# Patient Record
Sex: MALE | Race: BLACK OR AFRICAN AMERICAN | NOT HISPANIC OR LATINO | URBAN - METROPOLITAN AREA
[De-identification: names, ages, dates, MRNs, and addresses within clinical notes are randomized per-mention and may not be internally consistent; named-entity substitution may affect disease eponyms.]

---

## 2023-03-14 ENCOUNTER — INPATIENT (INPATIENT)
Facility: HOSPITAL | Age: 53
LOS: 0 days | Discharge: HOME | DRG: 247 | End: 2023-03-15
Attending: INTERNAL MEDICINE | Admitting: INTERNAL MEDICINE
Payer: COMMERCIAL

## 2023-03-14 ENCOUNTER — TRANSCRIPTION ENCOUNTER (OUTPATIENT)
Age: 53
End: 2023-03-14

## 2023-03-14 VITALS
DIASTOLIC BLOOD PRESSURE: 102 MMHG | HEART RATE: 96 BPM | OXYGEN SATURATION: 96 % | SYSTOLIC BLOOD PRESSURE: 176 MMHG | RESPIRATION RATE: 18 BRPM | WEIGHT: 250 LBS | TEMPERATURE: 98 F | HEIGHT: 72 IN

## 2023-03-14 DIAGNOSIS — R07.9 CHEST PAIN, UNSPECIFIED: ICD-10-CM

## 2023-03-14 DIAGNOSIS — Z98.890 OTHER SPECIFIED POSTPROCEDURAL STATES: Chronic | ICD-10-CM

## 2023-03-14 LAB — SARS-COV-2 RNA SPEC QL NAA+PROBE: SIGNIFICANT CHANGE UP

## 2023-03-14 PROCEDURE — 92928 PRQ TCAT PLMT NTRAC ST 1 LES: CPT | Mod: LC

## 2023-03-14 PROCEDURE — 93010 ELECTROCARDIOGRAM REPORT: CPT

## 2023-03-14 PROCEDURE — 99152 MOD SED SAME PHYS/QHP 5/>YRS: CPT

## 2023-03-14 PROCEDURE — 93010 ELECTROCARDIOGRAM REPORT: CPT | Mod: 77

## 2023-03-14 RX ORDER — LOSARTAN POTASSIUM 100 MG/1
50 TABLET, FILM COATED ORAL DAILY
Refills: 0 | Status: DISCONTINUED | OUTPATIENT
Start: 2023-03-14 | End: 2023-03-15

## 2023-03-14 RX ORDER — ASPIRIN/CALCIUM CARB/MAGNESIUM 324 MG
81 TABLET ORAL DAILY
Refills: 0 | Status: DISCONTINUED | OUTPATIENT
Start: 2023-03-14 | End: 2023-03-15

## 2023-03-14 RX ORDER — CLOPIDOGREL BISULFATE 75 MG/1
1 TABLET, FILM COATED ORAL
Qty: 0 | Refills: 0 | DISCHARGE

## 2023-03-14 RX ORDER — SODIUM CHLORIDE 9 MG/ML
1000 INJECTION INTRAMUSCULAR; INTRAVENOUS; SUBCUTANEOUS
Refills: 0 | Status: DISCONTINUED | OUTPATIENT
Start: 2023-03-14 | End: 2023-03-14

## 2023-03-14 RX ORDER — CLOPIDOGREL BISULFATE 75 MG/1
75 TABLET, FILM COATED ORAL DAILY
Refills: 0 | Status: DISCONTINUED | OUTPATIENT
Start: 2023-03-14 | End: 2023-03-15

## 2023-03-14 RX ORDER — ATORVASTATIN CALCIUM 80 MG/1
1 TABLET, FILM COATED ORAL
Qty: 0 | Refills: 0 | DISCHARGE

## 2023-03-14 RX ORDER — SODIUM CHLORIDE 9 MG/ML
1000 INJECTION INTRAMUSCULAR; INTRAVENOUS; SUBCUTANEOUS
Refills: 0 | Status: DISCONTINUED | OUTPATIENT
Start: 2023-03-14 | End: 2023-03-15

## 2023-03-14 RX ORDER — ATORVASTATIN CALCIUM 80 MG/1
1 TABLET, FILM COATED ORAL
Qty: 30 | Refills: 0
Start: 2023-03-14 | End: 2023-04-12

## 2023-03-14 RX ORDER — CLOPIDOGREL BISULFATE 75 MG/1
1 TABLET, FILM COATED ORAL
Qty: 90 | Refills: 3
Start: 2023-03-14 | End: 2024-03-07

## 2023-03-14 RX ORDER — ISOSORBIDE MONONITRATE 60 MG/1
30 TABLET, EXTENDED RELEASE ORAL DAILY
Refills: 0 | Status: DISCONTINUED | OUTPATIENT
Start: 2023-03-14 | End: 2023-03-15

## 2023-03-14 RX ORDER — ISOSORBIDE MONONITRATE 60 MG/1
1 TABLET, EXTENDED RELEASE ORAL
Qty: 0 | Refills: 0 | DISCHARGE

## 2023-03-14 RX ORDER — ATORVASTATIN CALCIUM 80 MG/1
40 TABLET, FILM COATED ORAL AT BEDTIME
Refills: 0 | Status: DISCONTINUED | OUTPATIENT
Start: 2023-03-14 | End: 2023-03-15

## 2023-03-14 RX ORDER — LOSARTAN POTASSIUM 100 MG/1
1 TABLET, FILM COATED ORAL
Qty: 0 | Refills: 0 | DISCHARGE

## 2023-03-14 RX ORDER — ASPIRIN/CALCIUM CARB/MAGNESIUM 324 MG
1 TABLET ORAL
Qty: 0 | Refills: 0 | DISCHARGE

## 2023-03-14 RX ADMIN — SODIUM CHLORIDE 75 MILLILITER(S): 9 INJECTION INTRAMUSCULAR; INTRAVENOUS; SUBCUTANEOUS at 12:26

## 2023-03-14 RX ADMIN — SODIUM CHLORIDE 250 MILLILITER(S): 9 INJECTION INTRAMUSCULAR; INTRAVENOUS; SUBCUTANEOUS at 10:50

## 2023-03-14 RX ADMIN — SODIUM CHLORIDE 150 MILLILITER(S): 9 INJECTION INTRAMUSCULAR; INTRAVENOUS; SUBCUTANEOUS at 18:12

## 2023-03-14 RX ADMIN — ATORVASTATIN CALCIUM 40 MILLIGRAM(S): 80 TABLET, FILM COATED ORAL at 21:43

## 2023-03-14 NOTE — DISCHARGE NOTE PROVIDER - NSDCCPCAREPLAN_GEN_ALL_CORE_FT
PRINCIPAL DISCHARGE DIAGNOSIS  Diagnosis: CAD (coronary artery disease)  Assessment and Plan of Treatment: Low salt, low fat diet.   Weight management.   Take medications as prescribed.    No smoking.  Follow up appointments with your doctor(s)  as instructed.        SECONDARY DISCHARGE DIAGNOSES  Diagnosis: HLD (hyperlipidemia)  Assessment and Plan of Treatment: Your LDL cholesterol will be less than 70mg/dL  Continue with your cholesterol medications. Eat a heart healthy diet that is low in saturated fats and salt, and includes whole grains, fruits, vegetables and lean protein; exercise regularly (consult with your physician or cardiologist first); maintain a heart healthy weight; if you smoke - quit (A resource to help you stop smoking is the [facility] – phone number [phone].). Continue to follow with your primary physician or cardiologist.

## 2023-03-14 NOTE — PATIENT PROFILE ADULT - FUNCTIONAL ASSESSMENT - BASIC MOBILITY 6.
Called pt and informed him Dr. Issa has ordered MRA COW with and with out contrast and gave him # to Imaging. Informed him Dr. Issa would like to see him in follow up. Gave clinic # to schedule and he verbally understood.    4 = No assist / stand by assistance

## 2023-03-14 NOTE — DISCHARGE NOTE PROVIDER - NSDCCPTREATMENT_GEN_ALL_CORE_FT
PRINCIPAL PROCEDURE  Procedure: Left heart cardiac catheterization  Findings and Treatment: 3/14 s/p MAMIE x1 to OM1 via RRA       PRINCIPAL PROCEDURE  Procedure: Left heart cardiac catheterization  Findings and Treatment: 3/14 s/p MAMIE x1 to OM1 via RRA  No heavy lifting for 2 weeks, no strenuous activity  ( pushing/ pulling) no driving for x 2 days,  you may shower 24 hours following procedure but no bathing or swimming for x1  week, no strenuous activity for x 1 week & follow up with your cardiologist in 1-2 week  Do not stop your Aspirin or Plavix unless instructed to do so by your cardiologist.

## 2023-03-14 NOTE — DISCHARGE NOTE PROVIDER - CARE PROVIDER_API CALL
Noble Palacios)  Cardiology; Internal Medicine  [address] [place] [address]  Phone: [phone]  Fax: [phone]  Follow Up Time: 2 weeks

## 2023-03-14 NOTE — PATIENT PROFILE ADULT - NSPROSPHOSPCHAPLAINYN_GEN_A_NUR
Number Of Freeze-Thaw Cycles: 3 freeze-thaw cycles
Post-Care Instructions: I reviewed with the patient in detail post-care instructions. Patient is to wear sunprotection, and avoid picking at any of the treated lesions. Pt may apply Vaseline to crusted or scabbing areas.
Duration Of Freeze Thaw-Cycle (Seconds): 0
Detail Level: Simple
Consent: The patient's consent was obtained including but not limited to risks of crusting, scabbing, blistering, scarring, darker or lighter pigmentary change, recurrence, incomplete removal and infection.
Render Post-Care Instructions In Note?: no
no

## 2023-03-14 NOTE — DISCHARGE NOTE PROVIDER - NSDCFUADDINST_GEN_ALL_CORE_FT
Wound Care: The day AFTER your procedure:  Remove bandage GENTLY, and clean using mild soap and gentle warm, water stream, pat dry.   Leave OPEN to air. YOU MAY SHOWER  DO NOT SOAK your procedure site for 1 week (no baths, no pools, no tubs)  Check your wrist or groin puncture site everyday.  A small amount of soreness and bruising is normal  ACTIVITY for the next 24 hours:  1) DO NOT DRIVE  2) DO NOT make any important decisions or sign legal documents  3) DO NOT operate heavy [employer]   You may resume sexual activity in 48 hours, unless otherwise instructed by your cardiologist  If your procedure was done through the WRIST, for the NEXT 3 DAYS:  AVOID pushing pulling  or repeated movement of that hand and wrist (eg: typing, hammering)  DO NOT LIFT anything more than 5 lbs     If your procedure was done through the GROIN, for the NEXT 5 DAYS:  Limit climbing the stairs, no strenuous activities, no pushing, no pulling, no straining  Do not lift anything that is 10lbs or heavier   MEDICATION:  Take your medications as explained (see discharge paperwork). If you recieved a stent, you will be taking medication to KEEP YOUR STENT OPEN. DO NOT STOP THESE MEDICATIONS UNLESS DIRECTED BY A CARDIOLOGIST  If you smoke, WE RECOMMEND YOU QUIT (you may call [phone] the Center of Tobacco Control if you need assistance)   FOLLOW UP: Please follow up with your private cardiologist (insert name) in 2 weeks.  Please call immediately for an appointment upon discharge from the hospital.    ***CALL YOUR DOCTOR***   If you experience: chest pain, fever, chills, body aches, or severe pain, swelling, redness, heat or yellow discharge at incision site or if you experience bleeding, temperature change, numbness or excruciating pain at the procedural site  If you are unable to reach your doctor, you may contact:    Cardiology Office at [facility] at [phone]   Cardiac Short Stay Unit (CSSU) [phone]    Cardiac Recovery Suite (CRS) [phone]

## 2023-03-14 NOTE — H&P CARDIOLOGY - HISTORY OF PRESENT ILLNESS
52 yo M Creole speaking ( [id number]) man with PMHx of HTN (Lisinopril/HCTZ did not take due to cough?) presented to [facility] ER on 3/10 with left sided chest pain. ASA, Plavix loaded, Lovenox 120mg BID (last dose was 3/13/10pm). <--148, Troponin negative (6.5) KEYLA: EF 55-60%, Cath on 3/13 showed OM1 90%, LM 20%, pLAD 20%, diag 30-40%, RCA 30%, now transferred to [facility] for PCI. Pt currently denies chest pain, SOB, dizziness or palpitation.     CBC: wnl, BUN/Cr 8/1.1 GFR>60              Cath on 3/13 showed OM1 90%, LM 20%, pLAD 20%, diag 30-40%, RCA 30%  TTE: EF 55-60%

## 2023-03-14 NOTE — PATIENT PROFILE ADULT - FALL HARM RISK - FACTORS NURSING JUDGEMENT
HPI  Pt here for upper right arm pain. 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 Taking aleve without improvement. Usually lays on right side but due to pain can't. Was carrying bags yesterday and felt numbness and tingling in right hand. on file      Highest education level: Not on file    Occupational History      Not on file    Social Needs      Financial resource strain: Not on file      Food insecurity        Worry: Not on file        Inability: Not on file      Transportation needs Dispense Refill   • naproxen 500 MG Oral Tab Take 1 tablet (500 mg total) by mouth 2 (two) times daily with meals.  60 tablet 1   • ALPRAZolam 0.25 MG Oral Tab TAKE 1 TABLET BY MOUTH EVERY NIGHT AS NEEDED FOR SLEEP OR ANXIETY 30 tablet 1   • aspirin 81 MG O No

## 2023-03-14 NOTE — DISCHARGE NOTE PROVIDER - NSDCMRMEDTOKEN_GEN_ALL_CORE_FT
Aspirin Enteric Coated 81 mg oral delayed release tablet: 1 tab(s) orally once a day  atorvastatin 40 mg oral tablet: 1 tab(s) orally once a day  Imdur 30 mg oral tablet, extended release: 1 tab(s) orally once a day (in the morning)  losartan 50 mg oral tablet: 1 tab(s) orally once a day  Lovenox 120 mg/0.8 mL injectable solution: 1 day(s) injectable 2 times a day  Last dose at 3/11 am  Plavix 75 mg oral tablet: 1 tab(s) orally once a day   Aspirin Enteric Coated 81 mg oral delayed release tablet: 1 tab(s) orally once a day  atorvastatin 40 mg oral tablet: 1 tab(s) orally once a day  Imdur 30 mg oral tablet, extended release: 1 tab(s) orally once a day (in the morning)  losartan 50 mg oral tablet: 1 tab(s) orally once a day  Plavix 75 mg oral tablet: 1 tab(s) orally once a day

## 2023-03-14 NOTE — CHART NOTE - NSCHARTNOTEFT_GEN_A_CORE
RRA band off at 11:30    3/13 RCA x2 MAMIE via RRA access  Staged x2 MAMIE to pLAD today via RRA access    RN called to check site: mild tight but good Radial pulse,   manual compression multiple times    Evaluated by Dr. Karol Miramontes now,   Pt can be discharged F/U with Dr. ISAAC Landaverde   Site care and reportable S/S discussed, pt and care giver fully understood and verbalized.

## 2023-03-14 NOTE — DISCHARGE NOTE PROVIDER - HOSPITAL COURSE
HPI: 52 yo M Creole speaking ( [id number]) man with PMHx of HTN (Lisinopril/HCTZ did not take due to cough?) presented to [facility] ER on 3/10 with left sided chest pain. ASA, Plavix loaded, Lovenox 120mg BID (last dose was 3/13/10pm). <--148, Troponin negative (6.5) KEYLA: EF 55-60%, Cath on 3/13 showed OM1 90%, LM 20%, pLAD 20%, diag 30-40%, RCA 30%, now transferred to [facility] for PCI. Pt currently denies chest pain, SOB, dizziness or palpitation.     CBC: wnl, BUN/Cr 8/1.1 GFR>60              Cath on 3/13 showed OM1 90%, LM 20%, pLAD 20%, diag 30-40%, RCA 30%  TTE: EF 55-60% (14 Mar 2023 10:12)    3/14 s/p MAMIE x1 to OM via RRA, site without hematoma/bleeding. Patient without complaint, hemodynamically stable overnight. Pending discharge in AM

## 2023-03-15 VITALS
HEART RATE: 66 BPM | OXYGEN SATURATION: 95 % | DIASTOLIC BLOOD PRESSURE: 81 MMHG | SYSTOLIC BLOOD PRESSURE: 131 MMHG | TEMPERATURE: 98 F | RESPIRATION RATE: 17 BRPM

## 2023-03-15 LAB
ANION GAP SERPL CALC-SCNC: 10 MMOL/L — SIGNIFICANT CHANGE UP (ref 5–17)
BUN SERPL-MCNC: 9 MG/DL — SIGNIFICANT CHANGE UP (ref 7–23)
CALCIUM SERPL-MCNC: 9.3 MG/DL — SIGNIFICANT CHANGE UP (ref 8.4–10.5)
CHLORIDE SERPL-SCNC: 104 MMOL/L — SIGNIFICANT CHANGE UP (ref 96–108)
CO2 SERPL-SCNC: 26 MMOL/L — SIGNIFICANT CHANGE UP (ref 22–31)
CREAT SERPL-MCNC: 1 MG/DL — SIGNIFICANT CHANGE UP (ref 0.5–1.3)
EGFR: 89 ML/MIN/1.73M2 — SIGNIFICANT CHANGE UP
GLUCOSE SERPL-MCNC: 107 MG/DL — HIGH (ref 70–99)
HCT VFR BLD CALC: 44.1 % — SIGNIFICANT CHANGE UP (ref 39–50)
HGB BLD-MCNC: 14.4 G/DL — SIGNIFICANT CHANGE UP (ref 13–17)
MAGNESIUM SERPL-MCNC: 2.1 MG/DL — SIGNIFICANT CHANGE UP (ref 1.6–2.6)
MCHC RBC-ENTMCNC: 30.3 PG — SIGNIFICANT CHANGE UP (ref 27–34)
MCHC RBC-ENTMCNC: 32.7 GM/DL — SIGNIFICANT CHANGE UP (ref 32–36)
MCV RBC AUTO: 92.6 FL — SIGNIFICANT CHANGE UP (ref 80–100)
NRBC # BLD: 0 /100 WBCS — SIGNIFICANT CHANGE UP (ref 0–0)
PLATELET # BLD AUTO: 207 K/UL — SIGNIFICANT CHANGE UP (ref 150–400)
POTASSIUM SERPL-MCNC: 3.7 MMOL/L — SIGNIFICANT CHANGE UP (ref 3.5–5.3)
POTASSIUM SERPL-SCNC: 3.7 MMOL/L — SIGNIFICANT CHANGE UP (ref 3.5–5.3)
RBC # BLD: 4.76 M/UL — SIGNIFICANT CHANGE UP (ref 4.2–5.8)
RBC # FLD: 13.3 % — SIGNIFICANT CHANGE UP (ref 10.3–14.5)
SODIUM SERPL-SCNC: 140 MMOL/L — SIGNIFICANT CHANGE UP (ref 135–145)
WBC # BLD: 6.61 K/UL — SIGNIFICANT CHANGE UP (ref 3.8–10.5)
WBC # FLD AUTO: 6.61 K/UL — SIGNIFICANT CHANGE UP (ref 3.8–10.5)

## 2023-03-15 PROCEDURE — C9600: CPT | Mod: LC

## 2023-03-15 PROCEDURE — C1887: CPT

## 2023-03-15 PROCEDURE — 85027 COMPLETE CBC AUTOMATED: CPT

## 2023-03-15 PROCEDURE — C1874: CPT

## 2023-03-15 PROCEDURE — 80048 BASIC METABOLIC PNL TOTAL CA: CPT

## 2023-03-15 PROCEDURE — 99232 SBSQ HOSP IP/OBS MODERATE 35: CPT

## 2023-03-15 PROCEDURE — 83735 ASSAY OF MAGNESIUM: CPT

## 2023-03-15 PROCEDURE — U0003: CPT

## 2023-03-15 PROCEDURE — C1894: CPT

## 2023-03-15 PROCEDURE — C1725: CPT

## 2023-03-15 PROCEDURE — 93005 ELECTROCARDIOGRAM TRACING: CPT

## 2023-03-15 PROCEDURE — C1769: CPT

## 2023-03-15 RX ORDER — ENOXAPARIN SODIUM 100 MG/ML
1 INJECTION SUBCUTANEOUS
Qty: 0 | Refills: 0 | DISCHARGE

## 2023-03-15 RX ADMIN — Medication 81 MILLIGRAM(S): at 05:34

## 2023-03-15 RX ADMIN — LOSARTAN POTASSIUM 50 MILLIGRAM(S): 100 TABLET, FILM COATED ORAL at 05:34

## 2023-03-15 RX ADMIN — ISOSORBIDE MONONITRATE 30 MILLIGRAM(S): 60 TABLET, EXTENDED RELEASE ORAL at 05:34

## 2023-03-15 RX ADMIN — CLOPIDOGREL BISULFATE 75 MILLIGRAM(S): 75 TABLET, FILM COATED ORAL at 05:34

## 2023-03-15 NOTE — DISCHARGE NOTE NURSING/CASE MANAGEMENT/SOCIAL WORK - PATIENT PORTAL LINK FT
You can access the FollowMyHealth Patient Portal offered by [facility] by registering at the following website: [email or web address]. By joining [employer]’s FollowMyHealth portal, you will also be able to view your health information using other applications (apps) compatible with our system.

## 2023-03-15 NOTE — PROGRESS NOTE ADULT - ASSESSMENT
HPI: 52 yo M Creole speaking ( [id number]) man with PMHx of HTN (Lisinopril/HCTZ did not take due to cough?) presented to [facility] ER on 3/10 with left sided chest pain. ASA, Plavix loaded, Lovenox 120mg BID (last dose was 3/13/10pm). <--148, Troponin negative (6.5) KEYLA: EF 55-60%, Cath on 3/13 showed OM1 90%, LM 20%, pLAD 20%, diag 30-40%, RCA 30%, now transferred to [facility] for PCI. Pt currently denies chest pain, SOB, dizziness or palpitation.     CBC: wnl, BUN/Cr 8/1.1 GFR>60              Cath on 3/13 showed OM1 90%, LM 20%, pLAD 20%, diag 30-40%, RCA 30%  TTE: EF 55-60% (14 Mar 2023 10:12)      # CAD  3/14 s/p MAMIE x1 to OM1 via RRA  Right wrist stable w/o bleeding or hematoma; site soft, non tender.  Right radial pulse palpable +2.  Denies chest pain, denies right wrist/arm/hand:  pain, numbness, or tingling   Cont DAPT- aspirin and plavix  Resume antihypertensives- losartan  Cont statin- atorvastatin   Continue monitoring telemetry  Keep Mg >2 K >4  f/u appt in 2 weeks post dc with oupt cardiologist  Anticipate discharge in AM if site and condition remain stable    - Reviewed and reinforced with patient:  wound care instructions, activities dos and donts, medication compliance specifically antiplatelet therapy given stent/s.    - Patient aware to take DAPT  as prescribed and DO NOT STOP taking without consulting cardiologist first or STENT/s WILL CLOSE  - Reviewed and reinforced with patient:  site complications ( eg: bleeding, excruciating pain at the procedural site, large sweliing-golf ball size-  extremity numbness, tingling, temperature change), or CHEST PAIN; pt aware that if any of those occur he/she must call cardiologist IMMEDIATELY or 911 or go to nearest emergency room   - Reviewed and reinforced a heart healthy diet, Smoking Cessation  - Patient verbalizes understanding of ALL OF THE ABOVE, and gives positive feedback       Jermain Solano [facility]  Invasive Cardiology  Ext [phone]

## 2023-03-15 NOTE — PROGRESS NOTE ADULT - SUBJECTIVE AND OBJECTIVE BOX
[facility] INVASIVE CARDIOLOGY- (Boone, Nicholas, Mauricio, Sacha, Gilmar, Beth, Anthony, Kumar, Darian)   CARDIAC CATH LAB, ACP TEAM   [phone]    CHIEF COMPLAINT: Patient is a 53y old  Male who presents with a chief complaint of     HPI:  54 yo M Creole speaking ( [id number]) man with PMHx of HTN (Lisinopril/HCTZ did not take due to cough?) presented to [facility] ER on 3/10 with left sided chest pain. ASA, Plavix loaded, Lovenox 120mg BID (last dose was 3/13/10pm). <--148, Troponin negative (6.5) KEYLA: EF 55-60%, Cath on 3/13 showed OM1 90%, LM 20%, pLAD 20%, diag 30-40%, RCA 30%, now transferred to [facility] for PCI. Pt currently denies chest pain, SOB, dizziness or palpitation.     CBC: wnl, BUN/Cr 8/1.1 GFR>60              Cath on 3/13 showed OM1 90%, LM 20%, pLAD 20%, diag 30-40%, RCA 30%  TTE: EF 55-60% (14 Mar 2023 10:12)        Subjective/Observations: patient seen and examined.  denies chest pain, dyspena, dizziness, palpitations, N&V, HA      Review of Systems all WNL except below indicated:    Constitutional: [ ] Fever [ ] Chills [ ] Fatigue [ ] Weight change   HEENT: [ ] Blurred vision [ ] Eye Pain [ ] Headache [ ] Runny nose [ ] Sore Throat   Respiratory: [ ] Cough [ ] Wheezing [ ] Shortness of breath  Cardiovascular: [ ] Chest Pain [ ] Palpitations [ ] FUNEZ [ ] PND [ ] Orthopnea  Gastrointestinal: [ ] Abdominal Pain [ ] Diarrhea [ ] Constipation [ ] Hemorrhoids [ ] Nausea [ ] Vomiting  Genitourinary: [ ] Nocturia [ ] Dysuria [ ] Incontinence  Extremities: [ ] Swelling [ ] Joint Pain  Neurologic: [ ] Focal deficit [ ] Paresthesias [ ] Syncope  Lymphatic: [ ] Swelling [ ] Lymphadenopathy   Skin: [ ] Rash [ ] Ecchymoses [ ] Wounds [ ] Lesions  Psychiatry: [ ] Depression [ ] Suicidal/Homicidal Ideation [ ] Anxiety [ ] Sleep Disturbances  [ ] 10 point review of systems is otherwise negative except as mentioned above            [ ]Unable to obtain    PAST MEDICAL & SURGICAL HISTORY:  HTN (hypertension)      H/O abdominal surgery          MEDICATIONS  (STANDING):  aspirin enteric coated 81 milliGRAM(s) Oral daily  atorvastatin 40 milliGRAM(s) Oral at bedtime  clopidogrel Tablet 75 milliGRAM(s) Oral daily  isosorbide   mononitrate ER Tablet (IMDUR) 30 milliGRAM(s) Oral daily  losartan 50 milliGRAM(s) Oral daily  sodium chloride 0.9%. 1000 milliLiter(s) (250 mL/Hr) IV Continuous <Continuous>  sodium chloride 0.9%. 1000 milliLiter(s) (75 mL/Hr) IV Continuous <Continuous>  sodium chloride 0.9%. 1000 milliLiter(s) (150 mL/Hr) IV Continuous <Continuous>    MEDICATIONS  (PRN):      Allergies    No Known Allergies    Intolerances    lisinopril (Other)        Vital Signs Last 24 Hrs  T(C): 36.7 (15 Mar 2023 00:44), Max: 37.2 (14 Mar 2023 14:40)  T(F): 98.1 (15 Mar 2023 00:44), Max: 98.9 (14 Mar 2023 14:40)  HR: 78 (15 Mar 2023 00:44) (66 - 96)  BP: 138/95 (15 Mar 2023 00:44) (126/89 - 176/102)  BP(mean): 108 (15 Mar 2023 00:44) (93 - 126)  RR: 17 (15 Mar 2023 00:44) (16 - 18)  SpO2: 98% (15 Mar 2023 00:44) (96% - 98%)    Parameters below as of 15 Mar 2023 00:44  Patient On (Oxygen Delivery Method): room air        I&O's Summary    14 Mar 2023 07:01  -  15 Mar 2023 02:22  --------------------------------------------------------  IN: 0 mL / OUT: 300 mL / NET: -300 mL      Weight (kg): 113.4 (03-14 @ 10:12)    FOCUSED PHYSICAL EXAM:  Pulmonary: Non-labored, breath sounds are clear bilaterally, No wheezing, rales or rhonchi  Cardiovascular: Regular, S1 and S2, No murmurs, rubs, gallops or clicks  cath site: ( groin/radial)  stable w/o bleeding or hematoma, soft, + pulses     LABS: All Labs Reviewed:                    RESULTS:    TELE intepretation:     ECG:    ECHO:    STRESS TEST:    CT CORONARIES:    CATH REPORT:       -----------------------------------------------------------------------------------------------------------------------------------------------------  ( Copy paste from here below onto the " assessment " section)     HPI:  54 yo M Creole speaking ( [id number]) man with PMHx of HTN (Lisinopril/HCTZ did not take due to cough?) presented to [facility] ER on 3/10 with left sided chest pain. ASA, Plavix loaded, Lovenox 120mg BID (last dose was 3/13/10pm). <--148, Troponin negative (6.5) KEYLA: EF 55-60%, Cath on 3/13 showed OM1 90%, LM 20%, pLAD 20%, diag 30-40%, RCA 30%, now transferred to [facility] for PCI. Pt currently denies chest pain, SOB, dizziness or palpitation.     CBC: wnl, BUN/Cr 8/1.1 GFR>60              Cath on 3/13 showed OM1 90%, LM 20%, pLAD 20%, diag 30-40%, RCA 30%  TTE: EF 55-60% (14 Mar 2023 10:12)        s/p cardiac cath on.... with stent to:      ECG: SR/Afib/ @ ....w/o significant ST or T wave changes  tele intepretation: .... with no  events overnight?      IF groin:  ( right/left) groin w/o bleeding or hematoma.  soft, non tender.  Pulses in the (right/left) lower extremity are (palpable/audible by doppler/absent).   Denies chest pain, denies groin/leg/foot: pain, numbness or tingling     IF radial:   Right wrist stable w/o bleeding or hematoma; site soft, non tender.  Right radial pulse palpable +2.  Denies chest pain, denies right wrist/arm/hand:  pain, numbness, or tingling     cont DAPT   cont antihypertesives ( specify BB, ACE, etc.), check flow sheet and report BP/HR trend   cont statin   please make sure DAPT is prescibed to pt's preferred pharmacy on dc   *triple therapy? ( if so for how long.. indicate)  * heparin gtt ?   Keep Mg >2 K >4  f/u appt in 2 weeks post dc with oupt cardiologist  for all  general cardiology questions please contact patient's primary cards team   all other care as per primary medicine  team       - Reviewed and reinforced with patient:  wound care instructions, activities dos and donts, medication compliance specifically antiplatelet therapy given stent/s.    - Patient aware to take DAPT  as prescribed and DO NOT STOP taking without consulting cardiologist first or STENT/s WILL CLOSE  - Reviewed and reinforced with patient:  site complications ( eg: bleeding, excruciating pain at the procedural site, large sweliing-golf ball size-  extremity numbness, tingling, temperature change), or CHEST PAIN; pt aware that if any of those occur he/she must call cardiologist IMMEDIATELY or 911 or go to nearest emergency room   - Reviewed and reinforced a heart healthy diet, Smoking Cessation  - Patient verbalizes understanding of ALL OF THE ABOVE, and gives positive feedback       Jermain Solano [facility]  Invasive Cardiology  Ext [phone]                [facility] INVASIVE CARDIOLOGY- (Boone, Nicholas, Mauricio, Sahca, Gilmar, Beth, Anthony, Kumar, Darian)   CARDIAC CATH LAB, ACP TEAM   [phone]    CHIEF COMPLAINT: Patient is a 53y old  Male who presents with a chief complaint of left sided chest pain    HPI: 54 yo M Creole speaking ( [id number]) man with PMHx of HTN (Lisinopril/HCTZ did not take due to cough?) presented to [facility] ER on 3/10 with left sided chest pain. ASA, Plavix loaded, Lovenox 120mg BID (last dose was 3/13/10pm). <--148, Troponin negative (6.5) KEYLA: EF 55-60%, Cath on 3/13 showed OM1 90%, LM 20%, pLAD 20%, diag 30-40%, RCA 30%, now transferred to [facility] for PCI. Pt currently denies chest pain, SOB, dizziness or palpitation.     CBC: wnl, BUN/Cr 8/1.1 GFR>60              Cath on 3/13 showed OM1 90%, LM 20%, pLAD 20%, diag 30-40%, RCA 30%  TTE: EF 55-60% (14 Mar 2023 10:12)      Subjective/Observations: patient seen and examined.  denies chest pain, dyspena, dizziness, palpitations, N&V, HA      Review of Systems all WNL except below indicated:    Constitutional: [ ] Fever [ ] Chills [ ] Fatigue [ ] Weight change   HEENT: [ ] Blurred vision [ ] Eye Pain [ ] Headache [ ] Runny nose [ ] Sore Throat   Respiratory: [ ] Cough [ ] Wheezing [ ] Shortness of breath  Cardiovascular: [ ] Chest Pain [ ] Palpitations [ ] FUNEZ [ ] PND [ ] Orthopnea  Gastrointestinal: [ ] Abdominal Pain [ ] Diarrhea [ ] Constipation [ ] Hemorrhoids [ ] Nausea [ ] Vomiting  Genitourinary: [ ] Nocturia [ ] Dysuria [ ] Incontinence  Extremities: [ ] Swelling [ ] Joint Pain  Neurologic: [ ] Focal deficit [ ] Paresthesias [ ] Syncope  Lymphatic: [ ] Swelling [ ] Lymphadenopathy   Skin: [ ] Rash [ ] Ecchymoses [ ] Wounds [ ] Lesions  Psychiatry: [ ] Depression [ ] Suicidal/Homicidal Ideation [ ] Anxiety [ ] Sleep Disturbances  [ ] 10 point review of systems is otherwise negative except as mentioned above            [ ]Unable to obtain      PAST MEDICAL & SURGICAL HISTORY:  HTN (hypertension)    H/O abdominal surgery      MEDICATIONS  (STANDING):  aspirin enteric coated 81 milliGRAM(s) Oral daily  atorvastatin 40 milliGRAM(s) Oral at bedtime  clopidogrel Tablet 75 milliGRAM(s) Oral daily  isosorbide   mononitrate ER Tablet (IMDUR) 30 milliGRAM(s) Oral daily  losartan 50 milliGRAM(s) Oral daily    MEDICATIONS  (PRN):      Allergies    No Known Allergies    Intolerances    lisinopril (Other)      Vital Signs Last 24 Hrs  T(C): 36.7 (15 Mar 2023 00:44), Max: 37.2 (14 Mar 2023 14:40)  T(F): 98.1 (15 Mar 2023 00:44), Max: 98.9 (14 Mar 2023 14:40)  HR: 78 (15 Mar 2023 00:44) (66 - 96)  BP: 138/95 (15 Mar 2023 00:44) (126/89 - 176/102)  BP(mean): 108 (15 Mar 2023 00:44) (93 - 126)  RR: 17 (15 Mar 2023 00:44) (16 - 18)  SpO2: 98% (15 Mar 2023 00:44) (96% - 98%)    Parameters below as of 15 Mar 2023 00:44  Patient On (Oxygen Delivery Method): room air      I&O's Summary    14 Mar 2023 07:01  -  15 Mar 2023 02:22  --------------------------------------------------------  IN: 0 mL / OUT: 300 mL / NET: -300 mL      Weight (kg): 113.4 (03-14 @ 10:12)    FOCUSED PHYSICAL EXAM:  Pulmonary: Non-labored, breath sounds are clear bilaterally, No wheezing, rales or rhonchi  Cardiovascular: Regular, S1 and S2, No murmurs, rubs, gallops or clicks  cath site: right radial stable w/o bleeding or hematoma, soft, + pulses     LABS: All Labs Reviewed:      RESULTS:    TELE interpretation: SR 80s    ECG:   Ventricular Rate 94 BPM    Atrial Rate 94 BPM    P-R Interval 174 ms    QRS Duration 102 ms    Q-T Interval 388 ms    QTC Calculation(Bazett) 485 ms    P Axis 33 degrees    R Axis -14 degrees    T Axis -13 degrees    Diagnosis Line NORMAL SINUS RHYTHM  MINIMAL VOLTAGE CRITERIA FOR LVH, MAY BE NORMAL VARIANT ( R in aVL )  NONSPECIFIC ST AND T WAVE ABNORMALITY  PROLONGED QT  ABNORMAL ECG  NO PREVIOUS ECGS AVAILABLE  Confirmed by CHRIS Campos Zlata ([id number]) on 3/14/2023 1:33:44 PM    CATH REPORT:  3/14 s/p MAMIE x1 to OM1 via RRA

## 2023-03-15 NOTE — DISCHARGE NOTE NURSING/CASE MANAGEMENT/SOCIAL WORK - NSDCPEFALRISK_GEN_ALL_CORE
For information on Fall & Injury Prevention, visit: [email or web address] OR  [email or web address] OR  https://www.cdc.gov/steadi/patient.html

## 2023-04-04 PROBLEM — Z00.00 ENCOUNTER FOR PREVENTIVE HEALTH EXAMINATION: Status: ACTIVE | Noted: 2023-04-04

## 2024-02-01 NOTE — H&P CARDIOLOGY - NSICDXFAMILYHX_GEN_ALL_CORE_FT
COPING, INEFFECTIVE COPING, INEFFECTIVE COPING, INEFFECTIVE FAMILY HISTORY:  No pertinent family history     COPING, INEFFECTIVE COPING, INEFFECTIVE COPING, INEFFECTIVE COPING, INEFFECTIVE COPING, INEFFECTIVE COPING, INEFFECTIVE COPING, INEFFECTIVE COPING, INEFFECTIVE COPING, INEFFECTIVE COPING, INEFFECTIVE COPING, INEFFECTIVE COPING, INEFFECTIVE COPING, INEFFECTIVE COPING, INEFFECTIVE COPING, INEFFECTIVE COPING, INEFFECTIVE COPING, INEFFECTIVE COPING, INEFFECTIVE
